# Patient Record
Sex: FEMALE | Race: WHITE | NOT HISPANIC OR LATINO | Employment: OTHER | URBAN - METROPOLITAN AREA
[De-identification: names, ages, dates, MRNs, and addresses within clinical notes are randomized per-mention and may not be internally consistent; named-entity substitution may affect disease eponyms.]

---

## 2019-06-27 ENCOUNTER — OFFICE VISIT (OUTPATIENT)
Dept: URGENT CARE | Age: 84
End: 2019-06-27
Payer: COMMERCIAL

## 2019-06-27 VITALS
HEIGHT: 60 IN | BODY MASS INDEX: 29.45 KG/M2 | RESPIRATION RATE: 19 BRPM | WEIGHT: 150 LBS | DIASTOLIC BLOOD PRESSURE: 70 MMHG | OXYGEN SATURATION: 96 % | TEMPERATURE: 98 F | SYSTOLIC BLOOD PRESSURE: 158 MMHG | HEART RATE: 59 BPM

## 2019-06-27 DIAGNOSIS — M25.562 POSTERIOR KNEE PAIN, LEFT: ICD-10-CM

## 2019-06-27 DIAGNOSIS — M79.662 PAIN AND SWELLING OF LEFT LOWER LEG: Primary | ICD-10-CM

## 2019-06-27 DIAGNOSIS — M79.89 PAIN AND SWELLING OF LEFT LOWER LEG: Primary | ICD-10-CM

## 2019-06-27 PROCEDURE — 99203 OFFICE O/P NEW LOW 30 MIN: CPT | Performed by: PHYSICIAN ASSISTANT

## 2019-06-27 RX ORDER — ATORVASTATIN CALCIUM 80 MG/1
TABLET, FILM COATED ORAL
Refills: 1 | COMMUNITY
Start: 2019-05-08

## 2019-06-27 RX ORDER — LISINOPRIL 10 MG/1
10 TABLET ORAL DAILY
COMMUNITY
Start: 2018-10-17

## 2019-06-27 RX ORDER — METOPROLOL TARTRATE 100 MG/1
TABLET ORAL
COMMUNITY
Start: 2019-05-17

## 2019-06-27 RX ORDER — ASPIRIN 81 MG/1
81 TABLET, CHEWABLE ORAL DAILY
COMMUNITY
Start: 2018-05-18

## 2019-06-27 RX ORDER — AMLODIPINE BESYLATE 2.5 MG/1
TABLET ORAL
Refills: 0 | COMMUNITY
Start: 2019-06-03

## 2022-02-09 ENCOUNTER — NEW PATIENT COMPREHENSIVE (OUTPATIENT)
Dept: URBAN - METROPOLITAN AREA CLINIC 6 | Facility: CLINIC | Age: 87
End: 2022-02-09

## 2022-02-09 DIAGNOSIS — H35.3231: ICD-10-CM

## 2022-02-09 DIAGNOSIS — H43.813: ICD-10-CM

## 2022-02-09 DIAGNOSIS — H04.123: ICD-10-CM

## 2022-02-09 DIAGNOSIS — D31.31: ICD-10-CM

## 2022-02-09 DIAGNOSIS — H35.033: ICD-10-CM

## 2022-02-09 PROCEDURE — 92134 CPTRZ OPH DX IMG PST SGM RTA: CPT

## 2022-02-09 PROCEDURE — 92004 COMPRE OPH EXAM NEW PT 1/>: CPT

## 2022-02-09 ASSESSMENT — VISUAL ACUITY
OD_PH: 20/100
OD_CC: 20/100
OS_CC: 20/100

## 2022-02-09 ASSESSMENT — TONOMETRY
OS_IOP_MMHG: 13
OD_IOP_MMHG: 11

## 2023-05-25 ENCOUNTER — ATHLETIC TRAINING (OUTPATIENT)
Dept: SPORTS MEDICINE | Facility: OTHER | Age: 88
End: 2023-05-25

## 2023-05-25 DIAGNOSIS — M79.661 PAIN OF RIGHT LOWER LEG: Primary | ICD-10-CM

## 2023-05-26 NOTE — PROGRESS NOTES
Isra King, a 80year old female with hx of Polio, fell while descending the stands [place] [facility]  [place] fell down one step/the last step  When I, Osiris Cuellar, arrived at the scene Chai Jones was long sitting and getting evaluated by a family friend  Patient said she did not see the last step which lead her to fall and twist her leg  She nor her family reported any head injury  CC lateral proximal tibia and fibula pain only when palpated  Patient presented with a polio brace on her lower extremity  Good sensation and was able to move her toes while in the polio brace  Unable to perform ankle movement due to the polio brace  Patient tried to bend her knee and revealed an obvious deformity in her lower leg  Immediately she straightened her leg back out and I instructed her to not move her lower extremity  I then instructed the patient's granddaughter to call EMS  I retrieved 2 STEPHIE splints and an ace wrap  STEPHIE splints were chosen due to the fact that the joint below the injury was already splinted by the polio brace and the patient was small enough that the STEPHIE splints could immobilize the knee  An intern assisted me as I stabilized the leg and we applied the STEPHIE splints on the anterior and posterior aspects of her knee to immobilize the knee  EMS arrived within about 5 minutes  EMS performed their own history and kept the leg immobilized  EMS picked up the patient in the long sitting position and placed her on their gurney

## 2023-06-02 ENCOUNTER — NURSING HOME VISIT (OUTPATIENT)
Dept: GERIATRICS | Facility: OTHER | Age: 88
End: 2023-06-02

## 2023-06-02 DIAGNOSIS — Z98.890 S/P ORIF (OPEN REDUCTION INTERNAL FIXATION) FRACTURE: ICD-10-CM

## 2023-06-02 DIAGNOSIS — D62 ACUTE BLOOD LOSS ANEMIA: ICD-10-CM

## 2023-06-02 DIAGNOSIS — Z87.81 S/P ORIF (OPEN REDUCTION INTERNAL FIXATION) FRACTURE: ICD-10-CM

## 2023-06-02 DIAGNOSIS — I10 PRIMARY HYPERTENSION: ICD-10-CM

## 2023-06-02 DIAGNOSIS — S82.101D CLOSED FRACTURE OF PROXIMAL END OF RIGHT TIBIA AND FIBULA WITH ROUTINE HEALING: Primary | ICD-10-CM

## 2023-06-02 DIAGNOSIS — R26.2 AMBULATORY DYSFUNCTION: ICD-10-CM
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
## 2023-06-02 DIAGNOSIS — S82.831D CLOSED FRACTURE OF PROXIMAL END OF RIGHT TIBIA AND FIBULA WITH ROUTINE HEALING: Primary | ICD-10-CM

## 2023-06-02 DIAGNOSIS — N18.31 STAGE 3A CHRONIC KIDNEY DISEASE (HCC): ICD-10-CM

## 2023-06-02 PROBLEM — A80.9 POLIO: Status: ACTIVE | Noted: 2023-06-02

## 2023-06-02 PROBLEM — N18.30 STAGE 3 CHRONIC KIDNEY DISEASE (HCC): Status: ACTIVE | Noted: 2023-06-02

## 2023-06-02 PROBLEM — I73.9 PVD (PERIPHERAL VASCULAR DISEASE) (HCC): Status: ACTIVE | Noted: 2023-06-02

## 2023-06-02 RX ORDER — AMLODIPINE BESYLATE 5 MG/1
5 TABLET ORAL DAILY
COMMUNITY

## 2023-06-02 RX ORDER — ATORVASTATIN CALCIUM 10 MG/1
10 TABLET, FILM COATED ORAL DAILY
COMMUNITY
End: 2023-06-05

## 2023-06-02 RX ORDER — FAMOTIDINE 20 MG/1
20 TABLET, FILM COATED ORAL 2 TIMES DAILY
COMMUNITY
End: 2023-06-05

## 2023-06-02 RX ORDER — ASPIRIN 81 MG/1
81 TABLET, CHEWABLE ORAL 2 TIMES DAILY
Start: 2023-06-02

## 2023-06-02 RX ORDER — ANTIOX #8/OM3/DHA/EPA/LUT/ZEAX 250-2.5 MG
CAPSULE ORAL 2 TIMES DAILY
COMMUNITY

## 2023-06-02 NOTE — PROGRESS NOTES
Facility: [facility]  [address] [place] [address], [facility] [address]  POS: 31 (STR)  Progress Note    Chief Complaint/Reason for visit: STR acute visit  History of Present Illness: 72-year-old female seen and examined  Received patient at bedside and appeared in no distress  Patient recently hospitalized for fall sustaining proximal tib-fib fracture s/p ORIF surgery on 5/26/2023 at [facility]   She has significant bruises and  right hip and right lower extremity  Patient reports having pain to right knee area during exam and currently not on any scheduled pain medications other than Aspercreme lidocaine topical patch  See A/P for additional information  Past Medical History: [facility] records reviewed  Past Medical History:   Diagnosis Date   • Polio    Hypertension, PVD, CKD, cataract, coronary artery disease, history of heart artery stent, hyperlipidemia, hypertension, macular degeneration, shingles, thrombocytopenia, thyroid nodule  Past surgical history: Cataract extraction, coronary stent placement 5/16/2018  Family History: Thyroid disorder daughter  Social History: Negative for drugs and negative for EtOH, screen not indicated for substance abuse screening  Review of systems: As per review of medical illness, all other systems reviewed and negative  Medications:  All medication and routine orders were reviewed and updated  Allergies: NKDA  Consults reviewed:PT, OT and Other   Labs/Diagnostics (reviewed by this provider): Copy in Chart hospital records  EMR and paper chart  CBC with differential 6/1/2023  Hemoglobin: 7 9   hematocrit: 23 0    WBC: 10 1   RBC: 2 65   platelet count: 849   MCV: 87  CMP 6/1/2023  Glucose: 98   BUN: 19   creatinine: 0 95 sodium: 135    potassium: 4 1    chloride: 102   carbon dioxide: 27    calcium: 8 2    alkaline phosphatase: 47 albumin: 3 2    bilirubin: 1 3    protein, total: 5 6   AST: 35   ALT: 30    anion gap: 6   GFR: 56  Imaging Reviewed: Hospital imaging    Physical Exam  Weight: 148  2 pounds Temp: 98         BP: 122/72  pulse: 80 resp: 16      Constitutional: Well-nourished  Orientation:Person, Place and Day     Physical Exam  Vitals and nursing note reviewed  Constitutional:       General: She is not in acute distress  Appearance: She is not ill-appearing, toxic-appearing or diaphoretic  HENT:      Head: Normocephalic  Nose: No congestion or rhinorrhea  Mouth/Throat:      Mouth: Mucous membranes are moist       Pharynx: No oropharyngeal exudate  Eyes:      General: No scleral icterus  Right eye: No discharge  Left eye: No discharge  Extraocular Movements: Extraocular movements intact  Conjunctiva/sclera: Conjunctivae normal       Pupils: Pupils are equal, round, and reactive to light  Cardiovascular:      Rate and Rhythm: Normal rate  Pulses: Normal pulses  Pulmonary:      Effort: Pulmonary effort is normal  No respiratory distress  Breath sounds: Normal breath sounds  No wheezing, rhonchi or rales  Abdominal:      General: Bowel sounds are normal  There is no distension  Palpations: Abdomen is soft  Tenderness: There is no abdominal tenderness  There is no guarding  Musculoskeletal:         General: Signs of injury (Right lower extremity) present  Right lower leg: Edema present  Left lower leg: No edema  Comments: Moves all 4 extremities  Skin:     General: Skin is warm and dry  Capillary Refill: Capillary refill takes less than 2 seconds  Findings: Bruising (Right hip and right lower extremity with significant bruising) present  Neurological:      General: No focal deficit present  Mental Status: She is alert and oriented to person, place, and time  Motor: Weakness present  Gait: Gait abnormal    Psychiatric:         Mood and Affect: Mood normal          Behavior: Behavior normal          Thought Content:  Thought content normal  Assessment/Plan:  19-year-old female with:    Closed fracture of proximal end of right tibia and fibula with routine healing  S/p fall; missed last step while walking down bleacher and twisted right leg, fell without head strike sustaining tib-fib fracture   S/p ORIF surgery on 5/26/2023 at [facility]  Wears RLE brace at baseline due to history of polio at 3years old  RLE surgical Incisions are intact per nursing who applied new dressings  RLE with significant bruises and edema  Patient is complaining of pain to right lower extremity  Continue aspirin twice daily for DVT prophylaxis  Ordered scheduled Tylenol 3 times daily and Arnica gel for bruised areas of RLE x10 days  Continue lidocaine topical patch  Add miralax daily to prevent constipation from immobility    Fall (on) (from) other stairs and steps, subsequent encounter  Recently hospitalized  See above plan    S/P ORIF (open reduction internal fixation) fracture  See above plan    Primary hypertension  BPs stable so far at SNF  Continue amlodipine 10 mg daily  Continue metoprolol tartrate 100 mg daily  Continue to monitor BP trend and adjust medications if clinically indicated    Stage 3 chronic kidney disease (HCC)  Baseline creatinine approximately 1 2 per record review  Most recent creatinine 0 95/GFR 56 on 6/1/2023  Avoid hypotension  Avoid nephrotoxic medications such as NSAIDs  Ensure adequate hydration  Continue to monitor BMP trends    Acute blood loss anemia  In setting of recent trauma resulting in fracture status post surgical repair  Baseline Hgb approximately 12-14  Hgb trend with most recent hemoglobin 7 9 on 6/1/2023<<8 1 on 5/31<<9 8 postsurgery on 5/29<<12 1 on 5/27  Order ferrous sulfate 1 daily   Recheck CBC in 1 week    Ambulatory dysfunction  In setting of recent trauma  Continue supportive care at [facility]  PT/OT  Fall precautions    This note was completed in part utilizing [employer] direct voice recognition software  Grammatical errors, random word insertion, spelling mistakes, and incomplete sentences may be an occasional consequence of the system secondary to software limitations, ambient noise and hardware issues  At the time of dictation, efforts were made to edit, clarify and/or correct errors  Please read the chart carefully and recognize, using context, where substitutions have occurred  If you have any questions or concerns about the context, text or information contained within the body of this dictation, please contact myself, the provider, for further clarification      Karina Flaherty [address] [place] [address]  4/3/325168:27 AM

## 2023-06-03 PROBLEM — D62 ACUTE BLOOD LOSS ANEMIA: Status: ACTIVE | Noted: 2023-06-03

## 2023-06-03 RX ORDER — FERROUS SULFATE 325(65) MG
325 TABLET ORAL EVERY OTHER DAY
COMMUNITY

## 2023-06-03 RX ORDER — POLYETHYLENE GLYCOL 3350 17 G/17G
17 POWDER, FOR SOLUTION ORAL DAILY
COMMUNITY

## 2023-06-03 NOTE — ASSESSMENT & PLAN NOTE
In setting of recent trauma resulting in fracture status post surgical repair  Baseline Hgb approximately 12-14  Hgb trend with most recent hemoglobin 7 9 on 6/1/2023<<8 1 on 5/31<<9 8 postsurgery on 5/29<<12 1 on 5/27  Order ferrous sulfate 1 daily   Recheck CBC in 1 week

## 2023-06-03 NOTE — ASSESSMENT & PLAN NOTE
S/p fall; missed last step while walking down bleacher and twisted right leg, fell without head strike sustaining tib-fib fracture   S/p ORIF surgery on 5/26/2023 at [facility]  Wears RLE brace at baseline due to history of polio at 3years old  RLE surgical Incisions are intact per nursing who applied new dressings   RLE with significant bruises and edema  Patient is complaining of pain to right lower extremity  Continue aspirin twice daily for DVT prophylaxis  Ordered scheduled Tylenol 3 times daily and Arnica gel for bruised areas of RLE x10 days  Continue lidocaine topical patch

## 2023-06-03 NOTE — ASSESSMENT & PLAN NOTE
BPs stable so far at [facility]  Continue amlodipine 10 mg daily  Continue metoprolol tartrate 100 mg daily  Continue to monitor BP trend and adjust medications if clinically indicated

## 2023-06-03 NOTE — ASSESSMENT & PLAN NOTE
Baseline creatinine approximately 1 2 per record review  Most recent creatinine 0 95/GFR 56 on 6/1/2023  Avoid hypotension  Avoid nephrotoxic medications such as NSAIDs  Ensure adequate hydration  Continue to monitor BMP trends

## 2023-06-05 ENCOUNTER — NURSING HOME VISIT (OUTPATIENT)
Dept: GERIATRICS | Facility: OTHER | Age: 88
End: 2023-06-05
Payer: COMMERCIAL
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
## 2023-06-05 DIAGNOSIS — S82.831D CLOSED FRACTURE OF PROXIMAL END OF RIGHT TIBIA AND FIBULA WITH ROUTINE HEALING: Primary | ICD-10-CM

## 2023-06-05 DIAGNOSIS — E55.9 VITAMIN D DEFICIENCY: ICD-10-CM

## 2023-06-05 DIAGNOSIS — Z98.890 S/P ORIF (OPEN REDUCTION INTERNAL FIXATION) FRACTURE: ICD-10-CM

## 2023-06-05 DIAGNOSIS — R26.2 AMBULATORY DYSFUNCTION: ICD-10-CM

## 2023-06-05 DIAGNOSIS — D62 ACUTE BLOOD LOSS ANEMIA: ICD-10-CM

## 2023-06-05 DIAGNOSIS — I10 PRIMARY HYPERTENSION: ICD-10-CM

## 2023-06-05 DIAGNOSIS — Z87.81 S/P ORIF (OPEN REDUCTION INTERNAL FIXATION) FRACTURE: ICD-10-CM

## 2023-06-05 DIAGNOSIS — N18.31 STAGE 3A CHRONIC KIDNEY DISEASE (HCC): ICD-10-CM

## 2023-06-05 DIAGNOSIS — D69.6 THROMBOCYTOPENIA (HCC): ICD-10-CM

## 2023-06-05 DIAGNOSIS — S82.101D CLOSED FRACTURE OF PROXIMAL END OF RIGHT TIBIA AND FIBULA WITH ROUTINE HEALING: Primary | ICD-10-CM

## 2023-06-05 PROBLEM — S32.592A CLOSED FRACTURE OF RAMUS OF LEFT PUBIS (HCC): Status: ACTIVE | Noted: 2022-04-27

## 2023-06-05 PROBLEM — I25.10 CAD (CORONARY ARTERY DISEASE): Status: ACTIVE | Noted: 2018-05-16

## 2023-06-05 PROBLEM — R53.81 DEBILITY: Status: ACTIVE | Noted: 2023-06-05

## 2023-06-05 PROBLEM — R53.81 DEBILITY: Status: RESOLVED | Noted: 2023-06-05 | Resolved: 2023-06-05

## 2023-06-05 PROCEDURE — 99306 1ST NF CARE HIGH MDM 50: CPT | Performed by: FAMILY MEDICINE

## 2023-06-05 RX ORDER — ARNICA 200 MG/ML
1 LIQUID TOPICAL 2 TIMES DAILY
COMMUNITY

## 2023-06-05 RX ORDER — FAMOTIDINE 20 MG/1
20 TABLET, FILM COATED ORAL DAILY
COMMUNITY
Start: 2023-05-31 | End: 2023-06-30

## 2023-06-05 RX ORDER — UREA 10 %
500 LOTION (ML) TOPICAL 4 TIMES DAILY PRN
COMMUNITY
Start: 2023-05-31 | End: 2024-05-30

## 2023-06-05 RX ORDER — LIDOCAINE 4 G/G
2 PATCH TOPICAL DAILY
COMMUNITY
Start: 2023-06-01

## 2023-06-05 RX ORDER — ACETAMINOPHEN 500 MG
500 TABLET ORAL 3 TIMES DAILY
COMMUNITY
Start: 2023-05-31 | End: 2023-06-10

## 2023-06-05 NOTE — PROGRESS NOTES
"[facility]   [address] [facility], [place], [address]  History and Physical    Records Reviewed include: Patient and hospital records    Chief Complaint/ Reason for Admission: fall with fracture of right tibia and fibula     History of Present Illness:             44-year-old female with a past medical history significant for hypertension, hyperlipidemia, macular degeneration and vitamin D deficiency who was recently admitted after falling and sustaining tibial and fibular fractures  Pain was controlled initially on IV meds and transition to oral regimen  Ortho recommended surgical intervention to repair fractures that took place on 5/26, DVT prophylaxis given and continued on discharge  After open reduction and internal fixation, patient was able to tolerate regular diet  Her Hgb dropped but remained stable afterward  She did develop chest pain and cardiology recommended further work up which patient declined  Patient is seen today for STR admission, she is in good spirits and denies any pain  There is some concern of sleep apnea by nursing staff however patient states she was never diagnosed with any sleep disorder and she would not like to proceed with a sleep study  She denies morning headaches, daytime sleepiness, or not feeling refresh when she wakes up  Her goal is to remain independent and come back to her apartment  She is complaining that she is taking \"many pills\" and at home she was Shannan Lloyd taking medications for her blood pressure and her Preservision vitamins\"               Allergies  No Known Allergies    Past Medical History  Past Medical History:   Diagnosis Date   • Polio       CHF baseline EF:  5/23/13 Nuclear exercise stress test: Significant reversible ischemia in the inferior wall consistent   with hemodynamically significant ischemia in the distribution of the right coronary artery   Normal wall motion  Normal left ventricular ejection fraction at 65%    CKD: Stage 3a , baseline creatinine " 1 1-1 3    No past surgical history on file  Family History  No family history on file  Social History  Social History     Tobacco Use   Smoking Status Not on file   Smokeless Tobacco Not on file      Social History     Substance and Sexual Activity   Alcohol Use Not on file      Social History     Substance and Sexual Activity   Drug Use Not on file        Lives:   Social Support:   Education Level:  Occupation:  Fall in the past 12 months: yes  Use of assistance Device: Walker    Physical Exam    Weight: 148 2 lb Temp:98 4 BP:132/68 Pulse:78 Resp:18 O2 Sat:93%  Constitutional: Well-nourished  Orientation:Person, Place, Day and Year     Physical Exam  Vitals reviewed  Constitutional:       Appearance: She is not toxic-appearing or diaphoretic  HENT:      Head: Normocephalic  Nose: Nose normal       Mouth/Throat:      Mouth: Mucous membranes are moist    Eyes:      General: No scleral icterus  Cardiovascular:      Rate and Rhythm: Normal rate  Pulses: Normal pulses  Pulmonary:      Breath sounds: Normal breath sounds  Abdominal:      General: There is no distension  Palpations: Abdomen is soft  Musculoskeletal:      Right lower leg: No edema  Left lower leg: Edema present  Skin:     General: Skin is warm  Findings: Bruising present  Neurological:      Mental Status: She is alert  Mental status is at baseline  Psychiatric:         Mood and Affect: Mood normal          Review of Systems:  Review of Systems   Constitutional: Positive for fatigue  Respiratory: Negative for shortness of breath  Cardiovascular: Positive for leg swelling  Negative for chest pain and palpitations  Gastrointestinal: Negative for abdominal distention, abdominal pain, nausea and vomiting  Genitourinary: Negative for difficulty urinating and dysuria  Musculoskeletal: Positive for arthralgias, joint swelling and myalgias  Neurological: Negative for headaches     Hematological: Bruises/bleeds easily  List of Current Medications: Allergies  No Known Allergies    Labs/Diagnostics (reviewed by this provider): Hospital Paperwork      Imaging Reviewed:    Assessment/Plan:  1  Closed fracture of proximal end of right tibia on CT  Is status post right tibial and fibular ORIF on 526  Continue DVT prophylaxis with aspirin 81 mg twice daily  PT and OT ongoing  Pain management with Tylenol 1000 mg 3 times daily, consider low-dose oxycodone   Weightbearing as tolerated   Follow-up with orthopedic surgery    2  Postop anemia  Hemoglobin dropped to 7 9 from baseline around 12, OR losses and frequent phlebotomy contributing  Decrease iron supplementation to every other day dose to reduce pill burden and improve absorption    3  Hypertension  Continue metoprolol succinate 100 mg daily  Reduce amlodipine to 5 mg daily    4  CAD  Patient presented chest pain with trop elevation 29-76 and EKG with lateral ST depression, [facility] study declined by patient, prefers to F/u with o/p cardiology  Continue beta-blocker and aspirin  Patient was not started on atorvastatin 10 mg during hospital stay, however patient complaining of pill burden, she did developed myalgias in the past, will hold statin for a few days and reintroduce very slowly  Follow up with cardiology       5  CKD stage 3  Continue to monitor kidney function  Avoid relative hypotension  Avoid nephrotoxins  Ensure adequate hydration    Pain: Present no Origin:  Location:  Rehab Potential:Fair  Patient Informed of Medical Condition: yes, If no, Reason:  Patient is Capable of Understanding Their Right: yes, If no, Reason:  Prognosis:Fair  Discharge Plan:   Surrogate Decision Maker:  Flu Vaccine: Vaccine: Ordered  Pneumovax:Vaccine: Ordered  Advanced Directives:   Code status:  PCP:    Livan Marcano MD  1/2/94514:25 PM

## 2023-06-08 ENCOUNTER — NURSING HOME VISIT (OUTPATIENT)
Dept: GERIATRICS | Facility: OTHER | Age: 88
End: 2023-06-08

## 2023-06-08 DIAGNOSIS — S82.831D CLOSED FRACTURE OF PROXIMAL END OF RIGHT TIBIA AND FIBULA WITH ROUTINE HEALING: Primary | ICD-10-CM

## 2023-06-08 DIAGNOSIS — I10 PRIMARY HYPERTENSION: ICD-10-CM

## 2023-06-08 DIAGNOSIS — S82.101D CLOSED FRACTURE OF PROXIMAL END OF RIGHT TIBIA AND FIBULA WITH ROUTINE HEALING: Primary | ICD-10-CM

## 2023-06-08 DIAGNOSIS — Z98.890 S/P ORIF (OPEN REDUCTION INTERNAL FIXATION) FRACTURE: ICD-10-CM

## 2023-06-08 DIAGNOSIS — I25.10 CORONARY ARTERY DISEASE INVOLVING NATIVE HEART, UNSPECIFIED VESSEL OR LESION TYPE, UNSPECIFIED WHETHER ANGINA PRESENT: ICD-10-CM

## 2023-06-08 DIAGNOSIS — R26.2 AMBULATORY DYSFUNCTION: ICD-10-CM

## 2023-06-08 DIAGNOSIS — Z87.81 S/P ORIF (OPEN REDUCTION INTERNAL FIXATION) FRACTURE: ICD-10-CM

## 2023-06-08 DIAGNOSIS — D62 ACUTE BLOOD LOSS ANEMIA: ICD-10-CM

## 2023-06-08 NOTE — ASSESSMENT & PLAN NOTE
"Patient had chest pain with troponin elevation 29-76 and EKG with lateral ST depression inpatient and patient declined [facility] study    Patient prefers to follow-up with cardiology outpatient  Continue metoprolol and aspirin  Currently not on a statin; patient is complaining of \" pill burden\" and also had myalgia in the past from statin per record review    "

## 2023-06-08 NOTE — PROGRESS NOTES
Facility: [facility]  [address] [place] [address], [address] [facility]  POS: 31 (STR)  Progress Note    Chief Complaint/Reason for visit: STR follow up visit  Code Status: DNR  History of Present Illness: 19-year-old female seen and examined for STR follow up of acute and chronic medical conditions  Received patient seated in chair and appeared in no distress  Patient reports that she had a difficult time falling asleep last night but when she fell asleep, she rested well  Patient reports right lower extremity pain is controlled with scheduled Tylenol and topical pain patch  No acute issues reported by nursing staff  Past Medical History: unchanged from history and physical  Past Medical History:   Diagnosis Date   • Polio      Family History: unchanged from history and physical  Social History: unchanged from history and physical  Review of systems: As per review of medical illness, all other systems reviewed and negative  Medications: All medication and routine orders were reviewed and updated  Allergies: NKDA  Consults reviewed:PT, OT and Other  Labs/Diagnostics (reviewed by this provider): Copy in Chart    Imaging Reviewed: None today    Physical Exam  Weight: 148  2 pounds Temp: 97 8        BP: 120/62       pulse: 78 resp: 18      O2 Sat: 98% on room air  Constitutional: Normocephalic  Orientation:Person, Place and Day     Physical Exam  Vitals and nursing note reviewed  Constitutional:       General: She is not in acute distress  Appearance: She is not ill-appearing, toxic-appearing or diaphoretic  HENT:      Head: Normocephalic  Nose: No congestion or rhinorrhea  Mouth/Throat:      Mouth: Mucous membranes are moist       Pharynx: No oropharyngeal exudate  Eyes:      General:         Right eye: No discharge  Left eye: No discharge  Extraocular Movements: Extraocular movements intact        Conjunctiva/sclera: Conjunctivae normal       Pupils: Pupils are equal, round, and reactive to light  Cardiovascular:      Rate and Rhythm: Normal rate and regular rhythm  Pulses: Normal pulses  Pulmonary:      Effort: Pulmonary effort is normal  No respiratory distress  Breath sounds: Normal breath sounds  No wheezing, rhonchi or rales  Abdominal:      General: Bowel sounds are normal  There is no distension  Palpations: Abdomen is soft  Tenderness: There is no abdominal tenderness  There is no guarding  Musculoskeletal:         General: Signs of injury present  Right lower leg: Edema present  Left lower leg: Edema present  Comments: Moves all 4 extremities  Skin:     General: Skin is warm and dry  Capillary Refill: Capillary refill takes less than 2 seconds  Findings: Bruising (Right lower extremity) present  Neurological:      Mental Status: She is alert and oriented to person, place, and time  Motor: Weakness present        Gait: Gait abnormal    Psychiatric:         Mood and Affect: Mood normal          Behavior: Behavior normal        Assessment/Plan:  80-year-old female with:    Closed fracture of proximal end of right tibia and fibula with routine healing  S/p fall and sustained tib-fib fracture s/p ORIF surgery 5/26/2023 at [address] [place] [address]  Surgical incisions are intact without signs of infection  Continues with edema and ecchymosis to right lower extremity  Continue aspirin twice daily for DVT prophylaxis  Pain controlled with scheduled Tylenol 1000 mg 3 times daily  Continue Arnica gel for bruised areas of right lower extremity for total of 10 days  Continue topical lidocaine patch  Follow-up with orthopedics    Acute blood loss anemia  In setting of recent trauma resulting in fracture status post surgical repair  Baseline hemoglobin approximately 12-14  Most recent hemoglobin 8 1 and previously 7 9  Continue ferrous sulfate every other day  Recheck CBC on 6/12/2023    Primary hypertension  BPs stable  Continue "amlodipine 10 mg daily  Continue metoprolol tartrate 100 mg daily  Recent BMP stable    CAD (coronary artery disease)  Patient had chest pain with troponin elevation 29-76 and EKG with lateral ST depression inpatient and patient declined [facility] study  Patient prefers to follow-up with cardiology outpatient  Continue metoprolol and aspirin  Currently not on a statin; patient is complaining of \" pill burden\" and also had myalgia in the past from statin per record review    S/P ORIF (open reduction internal fixation) fracture  See above plan    Ambulatory dysfunction  In setting of recent trauma  Patient wears right lower extremity leg brace at baseline due to history of polio  Continue supportive care at [facility]  Continue PT/OT  Continue fall precautions    This note was completed in part utilizing [employer] direct voice recognition software  Grammatical errors, random word insertion, spelling mistakes, and incomplete sentences may be an occasional consequence of the system secondary to software limitations, ambient noise and hardware issues  At the time of dictation, efforts were made to edit, clarify and/or correct errors  Please read the chart carefully and recognize, using context, where substitutions have occurred  If you have any questions or concerns about the context, text or information contained within the body of this dictation, please contact myself, the provider, for further clarification      Alter Krystina [address], [address]  6/0/87183:15 PM  "

## 2023-06-08 NOTE — ASSESSMENT & PLAN NOTE
BPs stable  Continue amlodipine 10 mg daily  Continue metoprolol tartrate 100 mg daily  Recent BMP stable

## 2023-06-08 NOTE — ASSESSMENT & PLAN NOTE
In setting of recent trauma resulting in fracture status post surgical repair  Baseline hemoglobin approximately 12-14  Most recent hemoglobin 8 1 and previously 7 9  Continue ferrous sulfate every other day  Recheck CBC on 6/12/2023

## 2023-06-08 NOTE — ASSESSMENT & PLAN NOTE
In setting of recent trauma  Patient wears right lower extremity leg brace at baseline due to history of polio  Continue supportive care at [facility]  Continue PT/OT  Continue fall precautions

## 2023-06-08 NOTE — ASSESSMENT & PLAN NOTE
S/p fall and sustained tib-fib fracture s/p ORIF surgery 5/26/2023 at [facility]  Surgical incisions are intact without signs of infection  Continues with edema and ecchymosis to right lower extremity  Continue aspirin twice daily for DVT prophylaxis  Pain controlled with scheduled Tylenol 1000 mg 3 times daily  Continue Arnica gel for bruised areas of right lower extremity for total of 10 days  Continue topical lidocaine patch  Follow-up with orthopedics

## 2023-06-13 ENCOUNTER — NURSING HOME VISIT (OUTPATIENT)
Dept: GERIATRICS | Facility: OTHER | Age: 88
End: 2023-06-13
Payer: COMMERCIAL

## 2023-06-13 DIAGNOSIS — R26.2 AMBULATORY DYSFUNCTION: ICD-10-CM

## 2023-06-13 DIAGNOSIS — D62 ACUTE BLOOD LOSS ANEMIA: ICD-10-CM

## 2023-06-13 DIAGNOSIS — S82.101D CLOSED FRACTURE OF PROXIMAL END OF RIGHT TIBIA AND FIBULA WITH ROUTINE HEALING: Primary | ICD-10-CM

## 2023-06-13 DIAGNOSIS — Z87.81 S/P ORIF (OPEN REDUCTION INTERNAL FIXATION) FRACTURE: ICD-10-CM

## 2023-06-13 DIAGNOSIS — S82.831D CLOSED FRACTURE OF PROXIMAL END OF RIGHT TIBIA AND FIBULA WITH ROUTINE HEALING: Primary | ICD-10-CM

## 2023-06-13 DIAGNOSIS — Z98.890 S/P ORIF (OPEN REDUCTION INTERNAL FIXATION) FRACTURE: ICD-10-CM

## 2023-06-13 PROCEDURE — 99309 SBSQ NF CARE MODERATE MDM 30: CPT | Performed by: NURSE PRACTITIONER

## 2023-06-13 RX ORDER — ACETAMINOPHEN 500 MG
1000 TABLET ORAL EVERY 8 HOURS
COMMUNITY

## 2023-06-13 NOTE — ASSESSMENT & PLAN NOTE
S/p fall sustaining tib-fib fracture   S/p ORIF surgery 5/26/2023 at [facility]  No signs of infection noted  Neurovascular status intact to right lower extremity  Continue aspirin 81 mg twice daily for DVT prophylaxis  Pain control with scheduled Tylenol 1000 mg 3 times a day  Continue Arnica gel for bruised areas of right lower extremity for total of 10 days  Continue topical lidocaine patch  Follow-up with orthopedics as scheduled tomorrow

## 2023-06-13 NOTE — ASSESSMENT & PLAN NOTE
In setting of recent trauma resulting in fracture s/p surgical repair  Continue ferrous sulfate every other day   Improved hemoglobin 9 7 today<< 8 1<<7 9

## 2023-06-13 NOTE — PROGRESS NOTES
Facility: [facility]  [address] [place] [address], [facility]   POS: 31 (STR)  Progress Note    Chief Complaint/Reason for visit: STR follow up visit  Code Status: DNR  History of Present Illness: 77-year-old female seen and examined for STR follow up of acute and chronic medical conditions  Received patient seated in chair by the window and appeared in no distress  Patient is pleasant and cooperative  Patient reports that she ambulated from her room all the way to the nurses station with therapist and pleased with her progress  She reports that her pain is controlled with current pain medication regimen  She is only taking scheduled over-the-counter 1000 mg every 8 hours  Ecchymosis and swelling noted to be decreasing to right lower extremity with each visit  Patient has a follow-up appointment with orthopedics tomorrow  See A/P for additional information  Past Medical History: unchanged from history and physical  Past Medical History:   Diagnosis Date   • Polio      Family History: unchanged from history and physical  Social History: unchanged from history and physical  Review of systems: As per review of medical illness, all other systems reviewed and negative  Medications: All medication and routine orders were reviewed and updated  Allergies: NKDA  Consults reviewed: Other  Labs/Diagnostics (reviewed by this provider): Copy in Chart    Imaging Reviewed: None today    Physical Exam  Temp: 98          BP: 132/76  pulse: 68      resp: 18   Constitutional: Normocephalic  Orientation:Person, Place, Day, Date and Month     Physical Exam  Vitals and nursing note reviewed  Constitutional:       General: She is not in acute distress  Appearance: She is not ill-appearing, toxic-appearing or diaphoretic  HENT:      Head: Normocephalic  Nose: No congestion or rhinorrhea  Mouth/Throat:      Mouth: Mucous membranes are moist       Pharynx: No oropharyngeal exudate     Eyes:      General: No scleral icterus  Right eye: No discharge  Left eye: No discharge  Extraocular Movements: Extraocular movements intact  Conjunctiva/sclera: Conjunctivae normal       Pupils: Pupils are equal, round, and reactive to light  Comments: Wears prescription glasses  Cardiovascular:      Rate and Rhythm: Normal rate and regular rhythm  Pulses: Normal pulses  Pulmonary:      Effort: Pulmonary effort is normal  No respiratory distress  Breath sounds: Normal breath sounds  No wheezing, rhonchi or rales  Abdominal:      General: Bowel sounds are normal  There is no distension  Palpations: Abdomen is soft  Tenderness: There is no abdominal tenderness  There is no guarding  Musculoskeletal:         General: Signs of injury (Signs of injury right lower extremity) present  Cervical back: Neck supple  No rigidity  Right lower leg: Edema present  Left lower leg: Edema (Trace) present  Comments: Moves all 4 extremities  Neurovascular status intact to right lower extremity  Lymphadenopathy:      Cervical: No cervical adenopathy  Skin:     General: Skin is warm and dry  Capillary Refill: Capillary refill takes less than 2 seconds  Findings: Bruising (Right lower extremity) present  Comments: Dressings clean dry and intact over surgical sites right lower extremity  Neurological:      Mental Status: She is alert and oriented to person, place, and time  Motor: Weakness present  Gait: Gait abnormal    Psychiatric:         Mood and Affect: Mood normal          Behavior: Behavior normal          Thought Content:  Thought content normal        Assessment/Plan:  80-year-old female with:    Closed fracture of proximal end of right tibia and fibula with routine healing  S/p fall sustaining tib-fib fracture   S/p ORIF surgery 5/26/2023 at [facility]  No signs of infection noted  Neurovascular status intact to right lower extremity  Continue aspirin 81 mg twice daily for DVT prophylaxis  Pain control with scheduled Tylenol 1000 mg 3 times a day  Continue Arnica gel for bruised areas of right lower extremity for total of 10 days  Continue topical lidocaine patch  Follow-up with orthopedics as scheduled tomorrow    S/P ORIF (open reduction internal fixation) fracture  See above plan    Acute blood loss anemia  In setting of recent trauma resulting in fracture s/p surgical repair  Continue ferrous sulfate every other day   Improved hemoglobin 9 7 today<< 8 1<<7 9    Ambulatory dysfunction  In setting of recent trauma  Continue supportive care at [facility]  Continue PT/OT  Continue fall precautions    This note was completed in part utilizing [facility] [employer] direct voice recognition software  Grammatical errors, random word insertion, spelling mistakes, and incomplete sentences may be an occasional consequence of the system secondary to software limitations, ambient noise and hardware issues  At the time of dictation, efforts were made to edit, clarify and/or correct errors  Please read the chart carefully and recognize, using context, where substitutions have occurred  If you have any questions or concerns about the context, text or information contained within the body of this dictation, please contact myself, the provider, for further clarification      Karina Flaherty 79, [address]  2/79/11534:81 PM

## 2023-06-13 NOTE — ASSESSMENT & PLAN NOTE
In setting of recent trauma  Continue supportive care at [facility]  Continue PT/OT  Continue fall precautions

## 2023-06-16 ENCOUNTER — NURSING HOME VISIT (OUTPATIENT)
Dept: GERIATRICS | Facility: OTHER | Age: 88
End: 2023-06-16
Payer: COMMERCIAL

## 2023-06-16 DIAGNOSIS — Z87.81 S/P ORIF (OPEN REDUCTION INTERNAL FIXATION) FRACTURE: ICD-10-CM

## 2023-06-16 DIAGNOSIS — S82.831D CLOSED FRACTURE OF PROXIMAL END OF RIGHT TIBIA AND FIBULA WITH ROUTINE HEALING: Primary | ICD-10-CM

## 2023-06-16 DIAGNOSIS — Z98.890 S/P ORIF (OPEN REDUCTION INTERNAL FIXATION) FRACTURE: ICD-10-CM

## 2023-06-16 DIAGNOSIS — S82.101D CLOSED FRACTURE OF PROXIMAL END OF RIGHT TIBIA AND FIBULA WITH ROUTINE HEALING: Primary | ICD-10-CM

## 2023-06-16 DIAGNOSIS — D62 ACUTE BLOOD LOSS ANEMIA: ICD-10-CM

## 2023-06-16 PROCEDURE — 99309 SBSQ NF CARE MODERATE MDM 30: CPT | Performed by: NURSE PRACTITIONER

## 2023-06-16 NOTE — ASSESSMENT & PLAN NOTE
S/p fall sustaining tib-fib fracture  S/p ORIF 5/26/2023 at [facility]  Neurovascular status intact to right lower extremity  Doing well at SNF  Continue aspirin 81 mg twice daily for DVT prophylaxis; need to follow-up with orthopedics regarding stop date  Patient states pain is controlled with scheduled Tylenol  Follow-up with orthopedics outpatient

## 2023-06-16 NOTE — PROGRESS NOTES
Facility: [facility]  [address] [place] [address], [facility]   POS: 31 (STR)  Progress Note    Chief Complaint/Reason for visit: STR follow up visit  Code status: DNR  History of Present Illness: 70-year-old female seen and examined for STR follow up of acute and chronic medical conditions  Received patient seated in chair and appeared in no distress  Patient feels that she is making progress at SNF, ambulated approximately 200 feet this morning and states that her arms felt tired after ambulating  Denies shortness of breath, chest pain, headache, dizziness, abdominal pain, nausea, vomiting, diarrhea, constipation  Patient reports that she is sleeping well  No acute issues reported by nursing staff  Past Medical History: unchanged from history and physical  Past Medical History:   Diagnosis Date   • Polio      Family History: unchanged from history and physical  Social History: unchanged from history and physical  Review of systems: As per review of medical illness, all other systems reviewed and negative  Medications: All medication and routine orders were reviewed and updated  Allergies: NKDA  Consults reviewed:PT, OT and Other  Labs/Diagnostics (reviewed by this provider): Copy in Chart paper chart    Imaging Reviewed: None today    Physical Exam    Weight: 142  2 pounds Temp: 98          BP: 122/72  pulse: 68 resp: 18      Constitutional: Normocephalic  Orientation:Person, Place, Day and Date     Physical Exam  Vitals and nursing note reviewed  Constitutional:       General: She is not in acute distress  Appearance: She is not ill-appearing, toxic-appearing or diaphoretic  HENT:      Head: Normocephalic  Nose: No congestion or rhinorrhea  Mouth/Throat:      Mouth: Mucous membranes are moist       Pharynx: No oropharyngeal exudate  Eyes:      General:         Right eye: No discharge  Left eye: No discharge  Extraocular Movements: Extraocular movements intact  Conjunctiva/sclera: Conjunctivae normal       Pupils: Pupils are equal, round, and reactive to light  Cardiovascular:      Rate and Rhythm: Normal rate  Pulses: Normal pulses  Pulmonary:      Effort: Pulmonary effort is normal  No respiratory distress  Breath sounds: Normal breath sounds  No wheezing, rhonchi or rales  Abdominal:      General: Bowel sounds are normal  There is no distension  Palpations: Abdomen is soft  Tenderness: There is no abdominal tenderness  There is no guarding  Musculoskeletal:         General: Signs of injury (Right lower extremity signs of injury) present  Cervical back: Neck supple  No rigidity  Right lower leg: Edema (+2 edema) present  Left lower leg: Edema (Trace edema) present  Comments: Moves all 4 extremities  Lymphadenopathy:      Cervical: No cervical adenopathy  Skin:     General: Skin is warm and dry  Capillary Refill: Capillary refill takes less than 2 seconds  Findings: Bruising (Right lower extremity) present  Neurological:      Mental Status: She is alert  Mental status is at baseline  Motor: Weakness present  Gait: Gait abnormal    Psychiatric:         Mood and Affect: Mood normal          Behavior: Behavior normal          Thought Content:  Thought content normal        Assessment/Plan:  42-year-old female with:    Closed fracture of proximal end of right tibia and fibula with routine healing  S/p fall sustaining tib-fib fracture  S/p [facility]26/[facility]  Neurovascular status intact to right lower extremity  Doing well at SNF  Continue aspirin 81 mg twice daily for DVT prophylaxis; need to follow-up with orthopedics regarding stop date  Patient states pain is controlled with scheduled Tylenol  Follow-up with orthopedics outpatient    S/P ORIF (open reduction internal fixation) fracture  See above plan    Acute blood loss anemia  In setting of recent trauma resulting in fracture status post surgical repair  Hemoglobin uptrending    Ambulatory dysfunction  In setting of recent trauma  Progressing in therapy  Continue supportive care at [facility]  Continue PT/OT  Continue fall precautions    This note was completed in part utilizing [employer] direct voice recognition software  Grammatical errors, random word insertion, spelling mistakes, and incomplete sentences may be an occasional consequence of the system secondary to software limitations, ambient noise and hardware issues  At the time of dictation, efforts were made to edit, clarify and/or correct errors  Please read the chart carefully and recognize, using context, where substitutions have occurred  If you have any questions or concerns about the context, text or information contained within the body of this dictation, please contact myself, the provider, for further clarification      Siri Peabody  0/34/[id number]:24 AM

## 2023-06-16 NOTE — ASSESSMENT & PLAN NOTE
In setting of recent trauma  Progressing in therapy  Continue supportive care at [facility]  Continue PT/OT  Continue fall precautions

## 2023-06-16 NOTE — ASSESSMENT & PLAN NOTE
In setting of recent trauma resulting in fracture status post surgical repair  Hemoglobin uptrending

## 2023-06-19 ENCOUNTER — NURSING HOME VISIT (OUTPATIENT)
Dept: GERIATRICS | Facility: OTHER | Age: 88
End: 2023-06-19
Payer: COMMERCIAL

## 2023-06-19 VITALS
HEART RATE: 70 BPM | SYSTOLIC BLOOD PRESSURE: 128 MMHG | OXYGEN SATURATION: 97 % | DIASTOLIC BLOOD PRESSURE: 66 MMHG | TEMPERATURE: 98 F

## 2023-06-19 DIAGNOSIS — S82.831D CLOSED FRACTURE OF PROXIMAL END OF RIGHT TIBIA AND FIBULA WITH ROUTINE HEALING: Primary | ICD-10-CM

## 2023-06-19 DIAGNOSIS — R26.2 AMBULATORY DYSFUNCTION: ICD-10-CM

## 2023-06-19 DIAGNOSIS — I10 PRIMARY HYPERTENSION: ICD-10-CM

## 2023-06-19 DIAGNOSIS — D62 ACUTE BLOOD LOSS ANEMIA: ICD-10-CM

## 2023-06-19 DIAGNOSIS — S82.101D CLOSED FRACTURE OF PROXIMAL END OF RIGHT TIBIA AND FIBULA WITH ROUTINE HEALING: Primary | ICD-10-CM

## 2023-06-19 PROCEDURE — 99309 SBSQ NF CARE MODERATE MDM 30: CPT

## 2023-06-19 NOTE — PROGRESS NOTES
[address]  [address] Rehab: POS 31    NAME: Kellen Alvarez  AGE: 80 y o  SEX: female  : 11/10/1931     DATE: 2023    CODE STATUS: DNR     Assessment and Plan:     Problem List Items Addressed This Visit        Cardiovascular and Mediastinum    Primary hypertension     Blood pressure stable  Goal BP less than 140/90  Continue amlodipine 10 mg daily and metoprolol 100 mg daily  Monitor BP at short-term rehab            Musculoskeletal and Integument    Closed fracture of proximal end of right tibia and fibula with routine healing - Primary     Status post fall  Status post ORIF 2023 at [facility]  Has +3 pitting edema to surgical leg with pain on palpation  We will order stat Doppler to rule out DVT  RLE also warm to touch; will order CBC to rule out cellulitis  Continue aspirin 81 mg twice daily for DVT prophylaxis  Continue to elevate and ice the leg  PT OT  Follow-up with orthopedics as outpatient            Other    Ambulatory dysfunction     Continue supportive care at [facility]  Continue PT OT  Continue fall and safety precautions         Acute blood loss anemia     In the setting of recent trauma resulting in fracture status postsurgical repair  Hemoglobin stable  Repeat CBC tomorrow               History of Present Illness:     Patient is a 80 y o  old female being seen at [facility] for follow up of acute and chronic medical conditions  Patient was recently hospitalized after sustaining a fall resulting in right tib-fib fracture  She is s/p ORIF doing well at short-term rehab  On exam her right leg is swollen with pitting edema and is tender to touch  She reports that it is tender and warm  She denies pain at rest   She reports that physical therapy is going well  Does report that she is tired after today's session  Denies chest pain and shortness of breath          The following portions of the patient's history were reviewed and updated as appropriate: allergies, current medications, past family history, past medical history, past social history, past surgical history and problem list      Review of Systems:     Review of Systems   Constitutional: Positive for fatigue  Negative for chills and fever  HENT: Negative for ear pain and sore throat  Eyes: Negative for pain and visual disturbance  Respiratory: Negative for cough and shortness of breath  Cardiovascular: Positive for leg swelling  Negative for chest pain and palpitations  Gastrointestinal: Negative for abdominal pain and vomiting  Genitourinary: Negative for dysuria and hematuria  Musculoskeletal: Negative for arthralgias and back pain  Skin: Negative for color change and rash  Neurological: Negative for seizures and syncope  All other systems reviewed and are negative  Problem List:     Patient Active Problem List   Diagnosis   • Closed fracture of proximal end of right tibia and fibula with routine healing   • Fall (on) (from) other stairs and steps, subsequent encounter   • S/P ORIF (open reduction internal fixation) fracture   • Primary hypertension   • Stage 3 [facility] ([facility] )   • Polio   • PVD (peripheral vascular disease) ([facility] )   • Ambulatory dysfunction   • Acute blood loss anemia   • Vitamin D deficiency   • Thrombocytopenia (HCC)   • Macular degeneration, bilateral   • Closed fracture of ramus of left pubis ([facility])   • CAD (coronary artery disease)        Objective:     /66   Pulse 70   Temp 98 °F (36 7 °C)   SpO2 97%     Physical Exam  Vitals and nursing note reviewed  Constitutional:       General: She is not in acute distress  Appearance: She is well-developed  She is not ill-appearing  HENT:      Head: Normocephalic and atraumatic  Eyes:      Conjunctiva/sclera: Conjunctivae normal    Cardiovascular:      Rate and Rhythm: Normal rate and regular rhythm  Heart sounds: Normal heart sounds   No murmur heard  Pulmonary:      Effort: Pulmonary effort is normal  No respiratory distress  Breath sounds: Normal breath sounds  Abdominal:      Palpations: Abdomen is soft  Tenderness: There is no abdominal tenderness  Musculoskeletal:         General: Tenderness present  Cervical back: Neck supple  Right lower leg: Edema present  Skin:     General: Skin is warm and dry  Capillary Refill: Capillary refill takes less than 2 seconds  Findings: No erythema  Neurological:      General: No focal deficit present  Mental Status: She is alert and oriented to person, place, and time  Psychiatric:         Mood and Affect: Mood normal          Behavior: Behavior normal          Pertinent Laboratory/Diagnostic Studies:    Laboratory Results: I have personally reviewed the pertinent laboratory results/reports     Radiology/Other Diagnostic Testing Results: I have personally reviewed pertinent reports        Chely Carter [facility]

## 2023-06-19 NOTE — ASSESSMENT & PLAN NOTE
In the setting of recent trauma resulting in fracture status postsurgical repair  Hemoglobin stable  Repeat CBC tomorrow

## 2023-06-19 NOTE — ASSESSMENT & PLAN NOTE
Blood pressure stable  Goal BP less than 140/90  Continue amlodipine 10 mg daily and metoprolol 100 mg daily  Monitor BP at short-term rehab

## 2023-06-19 NOTE — ASSESSMENT & PLAN NOTE
Status post fall  Status post ORIF 5/26/2023 at [address]  Has +3 pitting edema to surgical leg with pain on palpation  We will order stat Doppler to rule out DVT  RLE also warm to touch; will order CBC to rule out cellulitis  Continue aspirin 81 mg twice daily for DVT prophylaxis  Continue to elevate and ice the leg  PT OT  Follow-up with orthopedics as outpatient

## 2023-06-19 NOTE — ASSESSMENT & PLAN NOTE
Continue supportive care at [facility]  Continue PT OT  Continue fall and safety precautions

## 2023-06-21 ENCOUNTER — NURSING HOME VISIT (OUTPATIENT)
Dept: GERIATRICS | Facility: OTHER | Age: 88
End: 2023-06-21
Payer: COMMERCIAL

## 2023-06-21 VITALS
TEMPERATURE: 98 F | DIASTOLIC BLOOD PRESSURE: 66 MMHG | HEART RATE: 68 BPM | RESPIRATION RATE: 18 BRPM | OXYGEN SATURATION: 98 % | SYSTOLIC BLOOD PRESSURE: 118 MMHG

## 2023-06-21 DIAGNOSIS — D62 ACUTE BLOOD LOSS ANEMIA: ICD-10-CM

## 2023-06-21 DIAGNOSIS — S82.831D CLOSED FRACTURE OF PROXIMAL END OF RIGHT TIBIA AND FIBULA WITH ROUTINE HEALING: Primary | ICD-10-CM

## 2023-06-21 DIAGNOSIS — I10 PRIMARY HYPERTENSION: ICD-10-CM

## 2023-06-21 DIAGNOSIS — N18.31 STAGE 3A CHRONIC KIDNEY DISEASE (HCC): ICD-10-CM

## 2023-06-21 DIAGNOSIS — S82.101D CLOSED FRACTURE OF PROXIMAL END OF RIGHT TIBIA AND FIBULA WITH ROUTINE HEALING: Primary | ICD-10-CM

## 2023-06-21 DIAGNOSIS — I25.10 CORONARY ARTERY DISEASE INVOLVING NATIVE HEART, UNSPECIFIED VESSEL OR LESION TYPE, UNSPECIFIED WHETHER ANGINA PRESENT: ICD-10-CM

## 2023-06-21 PROCEDURE — 99316 NF DSCHRG MGMT 30 MIN+: CPT

## 2023-06-21 NOTE — ASSESSMENT & PLAN NOTE
Currently without chest pain  Continue metoprolol and aspirin  Currently not on a statin due to patient complaints of pill burden and myalgia in the past from statin Per record review

## 2023-06-21 NOTE — ASSESSMENT & PLAN NOTE
Baseline creatinine approximately 1 2  Most recent creatinine on 6/1/2023 0 95  Avoid hypotension  Avoid nephrotoxins  Ensure adequate hydration

## 2023-06-21 NOTE — ASSESSMENT & PLAN NOTE
In the setting of recent trauma resulting in fracture status postsurgical repair  Hemoglobin stable  CBC from 6/20/2023 reviewed

## 2023-06-21 NOTE — PROGRESS NOTES
[address] Rehab: POS 31  Discharge Note    NAME: Clay Cunningham  AGE: 80 y o  SEX: female  : 11/10/1931     DATE: 2023    CODE STATUS: DNR     Assessment and Plan:     Problem List Items Addressed This Visit        Cardiovascular and Mediastinum    Primary hypertension     Blood pressure stable  Goal BP less than 140/90  Continue amlodipine 10 mg daily and metoprolol 100 mg daily         CAD (coronary artery disease)     Currently without chest pain  Continue metoprolol and aspirin  Currently not on a statin due to patient complaints of pill burden and myalgia in the past from statin Per record review              Musculoskeletal and Integument    Closed fracture of proximal end of right tibia and fibula with routine healing - Primary     Post fall  Status post ORIF 2023 [facility]  Doppler of right lower extremity negative for DVT  Continue aspirin 81 mg twice daily for DVT prophylaxis  PT OT  Weightbearing as tolerated  Follow-up with orthopedics as outpatient            Genitourinary    Stage 3 chronic kidney disease ([address] )     Baseline creatinine approximately 1 2  Most recent creatinine on 2023 0 95  Avoid hypotension  Avoid nephrotoxins  Ensure adequate hydration              Other    Acute blood loss anemia     In the setting of recent trauma resulting in fracture status postsurgical repair  Hemoglobin stable  CBC from 2023 reviewed            Discussion with patient/family and further instructions:  -Fall precautions  -Aspiration precautions  -Bleeding precautions  -Monitor for signs/symptoms of infection  -Medication list was reviewed and signed  -DME form was completed     Follow-up Recommendations: Please follow-up with your primary care physician within 7-10 days of discharge to review medication changes and current status        Problem List Follow-up Recommendations:  I have spent 40 minutes with Patient /Family today in which greater than 50% of this time was spent in counseling/coordination of care  History of Present Illness:     Patient is a 80 y o  old female being seen at [facility] for follow up of acute and chronic medical conditions  Patient was recently hospitalized after sustaining a fall resulting in right tib-fib fracture  She is s/p ORIF doing well at short-term rehab  She continues with pitting edema to her right lower extremity  Doppler was negative for DVT  She denies pain at this time  She reports that physical therapy is going well  She will be discharged tomorrow back home to [facility]  The following portions of the patient's history were reviewed and updated as appropriate: allergies, current medications, past family history, past medical history, past social history, past surgical history and problem list      Review of Systems:     Review of Systems   Constitutional: Negative for chills and fever  HENT: Negative for ear pain and sore throat  Eyes: Negative for pain and visual disturbance  Respiratory: Negative for cough and shortness of breath  Cardiovascular: Positive for leg swelling  Negative for chest pain and palpitations  Gastrointestinal: Negative for abdominal pain and vomiting  Genitourinary: Negative for dysuria and hematuria  Musculoskeletal: Negative for arthralgias and back pain  Skin: Negative for color change and rash  Neurological: Negative for seizures and syncope  All other systems reviewed and are negative         Problem List:     Patient Active Problem List   Diagnosis   • Closed fracture of proximal end of right tibia and fibula with routine healing   • Fall (on) (from) other stairs and steps, subsequent encounter   • S/P ORIF (open reduction internal fixation) fracture   • Primary hypertension   • Stage 3 chronic kidney disease ([facility] )   • Polio   • P[facility]peripheral vascular disease) [facility] )   • Ambulatory dysfunction   • Acute blood loss anemia   • Vitamin D deficiency   • Thrombocytopenia (HCC)   • Macular degeneration, bilateral   • Closed fracture of ramus of left pubis (HCC)   • CAD (coronary artery disease)        Objective:     /66   Pulse 68   Temp 98 °F (36 7 °C)   Resp 18   SpO2 98%     Physical Exam  Vitals and nursing note reviewed  Constitutional:       General: She is not in acute distress  Appearance: She is well-developed  HENT:      Head: Normocephalic and atraumatic  Eyes:      Conjunctiva/sclera: Conjunctivae normal    Cardiovascular:      Rate and Rhythm: Normal rate and regular rhythm  Heart sounds: Normal heart sounds  No murmur heard  Pulmonary:      Effort: Pulmonary effort is normal  No respiratory distress  Breath sounds: Normal breath sounds  Abdominal:      Palpations: Abdomen is soft  Tenderness: There is no abdominal tenderness  Musculoskeletal:      Cervical back: Neck supple  Right lower leg: Edema present  Skin:     General: Skin is warm and dry  Capillary Refill: Capillary refill takes less than 2 seconds  Neurological:      General: No focal deficit present  Mental Status: She is alert and oriented to person, place, and time  Psychiatric:         Mood and Affect: Mood normal          Behavior: Behavior normal          Pertinent Laboratory/Diagnostic Studies:    Laboratory Results: I have personally reviewed the pertinent laboratory results/reports     Radiology/Other Diagnostic Testing Results: I have personally reviewed pertinent reports        Merrill Peace, [facility]

## 2023-06-21 NOTE — ASSESSMENT & PLAN NOTE
Blood pressure stable  Goal BP less than 140/90  Continue amlodipine 10 mg daily and metoprolol 100 mg daily

## 2023-06-21 NOTE — ASSESSMENT & PLAN NOTE
Post fall  [facility]  Doppler of right lower extremity negative for DVT  Continue aspirin 81 mg twice daily for DVT prophylaxis  PT OT  Weightbearing as tolerated  Follow-up with orthopedics as outpatient